# Patient Record
Sex: MALE | Race: WHITE | NOT HISPANIC OR LATINO | Employment: OTHER | URBAN - METROPOLITAN AREA
[De-identification: names, ages, dates, MRNs, and addresses within clinical notes are randomized per-mention and may not be internally consistent; named-entity substitution may affect disease eponyms.]

---

## 2019-03-10 ENCOUNTER — APPOINTMENT (EMERGENCY)
Dept: CT IMAGING | Facility: HOSPITAL | Age: 79
DRG: 176 | End: 2019-03-10
Payer: COMMERCIAL

## 2019-03-10 ENCOUNTER — APPOINTMENT (EMERGENCY)
Dept: RADIOLOGY | Facility: HOSPITAL | Age: 79
DRG: 176 | End: 2019-03-10
Payer: COMMERCIAL

## 2019-03-10 ENCOUNTER — HOSPITAL ENCOUNTER (INPATIENT)
Facility: HOSPITAL | Age: 79
LOS: 2 days | Discharge: HOME | DRG: 176 | End: 2019-03-12
Attending: EMERGENCY MEDICINE | Admitting: HOSPITALIST
Payer: COMMERCIAL

## 2019-03-10 ENCOUNTER — APPOINTMENT (INPATIENT)
Dept: ULTRASOUND IMAGING | Facility: HOSPITAL | Age: 79
DRG: 176 | End: 2019-03-10
Payer: COMMERCIAL

## 2019-03-10 DIAGNOSIS — R77.8 ELEVATED TROPONIN: ICD-10-CM

## 2019-03-10 DIAGNOSIS — I26.99 OTHER ACUTE PULMONARY EMBOLISM WITHOUT ACUTE COR PULMONALE (HCC): Primary | ICD-10-CM

## 2019-03-10 LAB
ALBUMIN SERPL BCP-MCNC: 3.3 G/DL (ref 3.5–5)
ALP SERPL-CCNC: 77 U/L (ref 46–116)
ALT SERPL W P-5'-P-CCNC: 30 U/L (ref 12–78)
ANION GAP SERPL CALCULATED.3IONS-SCNC: 8 MMOL/L (ref 4–13)
APTT PPP: 28 SECONDS (ref 26–38)
AST SERPL W P-5'-P-CCNC: 22 U/L (ref 5–45)
ATRIAL RATE: 101 BPM
BASOPHILS # BLD AUTO: 0.06 THOUSANDS/ΜL (ref 0–0.1)
BASOPHILS NFR BLD AUTO: 1 % (ref 0–1)
BILIRUB SERPL-MCNC: 0.7 MG/DL (ref 0.2–1)
BUN SERPL-MCNC: 22 MG/DL (ref 5–25)
CALCIUM SERPL-MCNC: 9.4 MG/DL (ref 8.3–10.1)
CHLORIDE SERPL-SCNC: 102 MMOL/L (ref 100–108)
CO2 SERPL-SCNC: 29 MMOL/L (ref 21–32)
CREAT SERPL-MCNC: 1.2 MG/DL (ref 0.6–1.3)
DEPRECATED D DIMER PPP: 7695 NG/ML (FEU)
EOSINOPHIL # BLD AUTO: 0.27 THOUSAND/ΜL (ref 0–0.61)
EOSINOPHIL NFR BLD AUTO: 3 % (ref 0–6)
ERYTHROCYTE [DISTWIDTH] IN BLOOD BY AUTOMATED COUNT: 13 % (ref 11.6–15.1)
ERYTHROCYTE [DISTWIDTH] IN BLOOD BY AUTOMATED COUNT: 13.3 % (ref 11.6–15.1)
GFR SERPL CREATININE-BSD FRML MDRD: 58 ML/MIN/1.73SQ M
GLUCOSE SERPL-MCNC: 142 MG/DL (ref 65–140)
HCT VFR BLD AUTO: 47.4 % (ref 36.5–49.3)
HCT VFR BLD AUTO: 48.1 % (ref 36.5–49.3)
HGB BLD-MCNC: 15.5 G/DL (ref 12–17)
HGB BLD-MCNC: 16.4 G/DL (ref 12–17)
IMM GRANULOCYTES # BLD AUTO: 0.05 THOUSAND/UL (ref 0–0.2)
IMM GRANULOCYTES NFR BLD AUTO: 1 % (ref 0–2)
INR PPP: 1.05 (ref 0.86–1.17)
LYMPHOCYTES # BLD AUTO: 2.34 THOUSANDS/ΜL (ref 0.6–4.47)
LYMPHOCYTES NFR BLD AUTO: 23 % (ref 14–44)
MCH RBC QN AUTO: 29 PG (ref 26.8–34.3)
MCH RBC QN AUTO: 29.8 PG (ref 26.8–34.3)
MCHC RBC AUTO-ENTMCNC: 32.7 G/DL (ref 31.4–37.4)
MCHC RBC AUTO-ENTMCNC: 34.1 G/DL (ref 31.4–37.4)
MCV RBC AUTO: 87 FL (ref 82–98)
MCV RBC AUTO: 89 FL (ref 82–98)
MONOCYTES # BLD AUTO: 0.65 THOUSAND/ΜL (ref 0.17–1.22)
MONOCYTES NFR BLD AUTO: 6 % (ref 4–12)
NEUTROPHILS # BLD AUTO: 6.78 THOUSANDS/ΜL (ref 1.85–7.62)
NEUTS SEG NFR BLD AUTO: 66 % (ref 43–75)
NRBC BLD AUTO-RTO: 0 /100 WBCS
NT-PROBNP SERPL-MCNC: 142 PG/ML
P AXIS: 48 DEGREES
PLATELET # BLD AUTO: 182 THOUSANDS/UL (ref 149–390)
PLATELET # BLD AUTO: 191 THOUSANDS/UL (ref 149–390)
PMV BLD AUTO: 9.4 FL (ref 8.9–12.7)
PMV BLD AUTO: 9.7 FL (ref 8.9–12.7)
POTASSIUM SERPL-SCNC: 3.4 MMOL/L (ref 3.5–5.3)
PR INTERVAL: 128 MS
PROT SERPL-MCNC: 7.4 G/DL (ref 6.4–8.2)
PROTHROMBIN TIME: 13.4 SECONDS (ref 11.8–14.2)
QRS AXIS: -12 DEGREES
QRSD INTERVAL: 100 MS
QT INTERVAL: 352 MS
QTC INTERVAL: 451 MS
RBC # BLD AUTO: 5.35 MILLION/UL (ref 3.88–5.62)
RBC # BLD AUTO: 5.51 MILLION/UL (ref 3.88–5.62)
SODIUM SERPL-SCNC: 139 MMOL/L (ref 136–145)
T WAVE AXIS: 73 DEGREES
TROPONIN I SERPL-MCNC: 0.48 NG/ML
TROPONIN I SERPL-MCNC: 1.85 NG/ML
TROPONIN I SERPL-MCNC: 2.37 NG/ML
VENTRICULAR RATE: 99 BPM
WBC # BLD AUTO: 10.15 THOUSAND/UL (ref 4.31–10.16)
WBC # BLD AUTO: 11.11 THOUSAND/UL (ref 4.31–10.16)

## 2019-03-10 PROCEDURE — 71275 CT ANGIOGRAPHY CHEST: CPT

## 2019-03-10 PROCEDURE — 85027 COMPLETE CBC AUTOMATED: CPT | Performed by: HOSPITALIST

## 2019-03-10 PROCEDURE — 83880 ASSAY OF NATRIURETIC PEPTIDE: CPT | Performed by: EMERGENCY MEDICINE

## 2019-03-10 PROCEDURE — 36415 COLL VENOUS BLD VENIPUNCTURE: CPT | Performed by: EMERGENCY MEDICINE

## 2019-03-10 PROCEDURE — 93970 EXTREMITY STUDY: CPT

## 2019-03-10 PROCEDURE — 85730 THROMBOPLASTIN TIME PARTIAL: CPT | Performed by: EMERGENCY MEDICINE

## 2019-03-10 PROCEDURE — 85379 FIBRIN DEGRADATION QUANT: CPT | Performed by: EMERGENCY MEDICINE

## 2019-03-10 PROCEDURE — 85610 PROTHROMBIN TIME: CPT | Performed by: EMERGENCY MEDICINE

## 2019-03-10 PROCEDURE — 99291 CRITICAL CARE FIRST HOUR: CPT

## 2019-03-10 PROCEDURE — 99223 1ST HOSP IP/OBS HIGH 75: CPT | Performed by: HOSPITALIST

## 2019-03-10 PROCEDURE — 85730 THROMBOPLASTIN TIME PARTIAL: CPT | Performed by: HOSPITALIST

## 2019-03-10 PROCEDURE — 93010 ELECTROCARDIOGRAM REPORT: CPT | Performed by: INTERNAL MEDICINE

## 2019-03-10 PROCEDURE — 80053 COMPREHEN METABOLIC PANEL: CPT | Performed by: EMERGENCY MEDICINE

## 2019-03-10 PROCEDURE — 85610 PROTHROMBIN TIME: CPT | Performed by: HOSPITALIST

## 2019-03-10 PROCEDURE — 84484 ASSAY OF TROPONIN QUANT: CPT | Performed by: EMERGENCY MEDICINE

## 2019-03-10 PROCEDURE — 93005 ELECTROCARDIOGRAM TRACING: CPT

## 2019-03-10 PROCEDURE — 71046 X-RAY EXAM CHEST 2 VIEWS: CPT

## 2019-03-10 PROCEDURE — 85025 COMPLETE CBC W/AUTO DIFF WBC: CPT | Performed by: EMERGENCY MEDICINE

## 2019-03-10 PROCEDURE — 84484 ASSAY OF TROPONIN QUANT: CPT | Performed by: HOSPITALIST

## 2019-03-10 PROCEDURE — 96360 HYDRATION IV INFUSION INIT: CPT

## 2019-03-10 RX ORDER — ALPRAZOLAM 0.5 MG/1
0.5 TABLET ORAL
Status: DISCONTINUED | OUTPATIENT
Start: 2019-03-10 | End: 2019-03-12 | Stop reason: HOSPADM

## 2019-03-10 RX ORDER — HEPARIN SODIUM 1000 [USP'U]/ML
4200 INJECTION, SOLUTION INTRAVENOUS; SUBCUTANEOUS AS NEEDED
Status: DISCONTINUED | OUTPATIENT
Start: 2019-03-10 | End: 2019-03-10 | Stop reason: SDUPTHER

## 2019-03-10 RX ORDER — HEPARIN SODIUM 1000 [USP'U]/ML
8400 INJECTION, SOLUTION INTRAVENOUS; SUBCUTANEOUS ONCE
Status: COMPLETED | OUTPATIENT
Start: 2019-03-10 | End: 2019-03-10

## 2019-03-10 RX ORDER — HEPARIN SODIUM 1000 [USP'U]/ML
4000 INJECTION, SOLUTION INTRAVENOUS; SUBCUTANEOUS AS NEEDED
Status: DISPENSED | OUTPATIENT
Start: 2019-03-10 | End: 2019-03-12

## 2019-03-10 RX ORDER — HEPARIN SODIUM 10000 [USP'U]/100ML
3-30 INJECTION, SOLUTION INTRAVENOUS
Status: DISCONTINUED | OUTPATIENT
Start: 2019-03-10 | End: 2019-03-10 | Stop reason: SDUPTHER

## 2019-03-10 RX ORDER — ASPIRIN 325 MG
325 TABLET ORAL ONCE
Status: COMPLETED | OUTPATIENT
Start: 2019-03-10 | End: 2019-03-10

## 2019-03-10 RX ORDER — HEPARIN SODIUM 1000 [USP'U]/ML
8400 INJECTION, SOLUTION INTRAVENOUS; SUBCUTANEOUS AS NEEDED
Status: DISCONTINUED | OUTPATIENT
Start: 2019-03-10 | End: 2019-03-10 | Stop reason: SDUPTHER

## 2019-03-10 RX ORDER — HEPARIN SODIUM 10000 [USP'U]/100ML
3-30 INJECTION, SOLUTION INTRAVENOUS
Status: DISPENSED | OUTPATIENT
Start: 2019-03-10 | End: 2019-03-12

## 2019-03-10 RX ORDER — HEPARIN SODIUM 1000 [USP'U]/ML
8000 INJECTION, SOLUTION INTRAVENOUS; SUBCUTANEOUS AS NEEDED
Status: ACTIVE | OUTPATIENT
Start: 2019-03-10 | End: 2019-03-12

## 2019-03-10 RX ADMIN — IOHEXOL 85 ML: 350 INJECTION, SOLUTION INTRAVENOUS at 16:38

## 2019-03-10 RX ADMIN — HEPARIN SODIUM AND DEXTROSE 18 UNITS/KG/HR: 10000; 5 INJECTION INTRAVENOUS at 17:41

## 2019-03-10 RX ADMIN — SODIUM CHLORIDE 1000 ML: 0.9 INJECTION, SOLUTION INTRAVENOUS at 16:19

## 2019-03-10 RX ADMIN — HEPARIN SODIUM 8400 UNITS: 1000 INJECTION, SOLUTION INTRAVENOUS; SUBCUTANEOUS at 17:41

## 2019-03-10 RX ADMIN — ASPIRIN 325 MG: 325 TABLET ORAL at 15:35

## 2019-03-10 NOTE — PLAN OF CARE
Problem: Potential for Falls  Goal: Patient will remain free of falls  Description  INTERVENTIONS:  - Assess patient frequently for physical needs  -  Identify cognitive and physical deficits and behaviors that affect risk of falls    -  [place] fall precautions as indicated by assessment   - Educate patient/family on patient safety including physical limitations  - Instruct patient to call for assistance with activity based on assessment  - Modify environment to reduce risk of injury  - Consider OT/PT consult to assist with strengthening/mobility  Outcome: Progressing     Problem: PAIN - ADULT  Goal: Verbalizes/displays adequate comfort level or baseline comfort level  Description  Interventions:  - Encourage patient to monitor pain and request assistance  - Assess pain using appropriate pain scale  - Administer analgesics based on type and severity of pain and evaluate response  - Implement non-pharmacological measures as appropriate and evaluate response  - Consider cultural and social influences on pain and pain management  - Notify physician/advanced practitioner if interventions unsuccessful or patient reports new pain  Outcome: Progressing     Problem: INFECTION - ADULT  Goal: Absence or prevention of progression during hospitalization  Description  INTERVENTIONS:  - Assess and monitor for signs and symptoms of infection  - Monitor lab/diagnostic results  - Monitor all insertion sites, i e  indwelling lines, tubes, and drains  - Monitor endotracheal (as able) and nasal secretions for changes in amount and color  - [place] appropriate cooling/warming therapies per order  - Administer medications as ordered  - Instruct and encourage patient and family to use good hand hygiene technique  - Identify and instruct in appropriate isolation precautions for identified infection/condition  Outcome: Progressing  Goal: Absence of fever/infection during neutropenic period  Description  INTERVENTIONS:  - Monitor WBC  - Implement neutropenic guidelines  Outcome: Progressing     Problem: SAFETY ADULT  Goal: Maintain or return to baseline ADL function  Description  INTERVENTIONS:  -  Assess patient's ability to carry out ADLs; assess patient's baseline for ADL function and identify physical deficits which impact ability to perform ADLs (bathing, care of mouth/teeth, toileting, grooming, dressing, etc )  - Assess/evaluate cause of self-care deficits   - Assess range of motion  - Assess patient's mobility; develop plan if impaired  - Assess patient's need for assistive devices and provide as appropriate  - Encourage maximum independence but intervene and supervise when necessary  ¯ Involve family in performance of ADLs  ¯ Assess for home care needs following discharge   ¯ Request OT consult to assist with ADL evaluation and planning for discharge  ¯ Provide patient education as appropriate  Outcome: Progressing  Goal: Maintain or return mobility status to optimal level  Description  INTERVENTIONS:  - Assess patient's baseline mobility status (ambulation, transfers, stairs, etc )    - Identify cognitive and physical deficits and behaviors that affect mobility  - Identify mobility aids required to assist with transfers and/or ambulation (gait belt, sit-to-stand, lift, walker, cane, etc )  - [place] fall precautions as indicated by assessment  - Record patient progress and toleration of activity level on Mobility SBAR; progress patient to next Phase/Stage  - Instruct patient to call for assistance with activity based on assessment  - Request Rehabilitation consult to assist with strengthening/weightbearing, etc   Outcome: Progressing     Problem: DISCHARGE PLANNING  Goal: Discharge to home or other facility with appropriate resources  Description  INTERVENTIONS:  - Identify barriers to discharge w/patient and caregiver  - Arrange for needed discharge resources and transportation as appropriate  - Identify discharge learning needs (meds, wound care, etc )  - Arrange for interpretive services to assist at discharge as needed  - Refer to Case Management Department for coordinating discharge planning if the patient needs post-hospital services based on physician/advanced practitioner order or complex needs related to functional status, cognitive ability, or social support system  Outcome: Progressing     Problem: Knowledge Deficit  Goal: Patient/family/caregiver demonstrates understanding of disease process, treatment plan, medications, and discharge instructions  Description  Complete learning assessment and assess knowledge base    Interventions:  - Provide teaching at level of understanding  - Provide teaching via preferred learning methods  Outcome: Progressing

## 2019-03-10 NOTE — ED PROVIDER NOTES
History  Chief Complaint   Patient presents with    Shortness of Breath     Pt reports having BM and afterwards while trying to get up he had SOB  Pt reports breathing is back to normal  Pt denies CP but reports having taken 1 nitro which reports may be   Patient is a 14-year-old male with history of hypertension and hyperlipidemia who presents with shortness of breath  Patient states he was sitting on the toilet and developed acute onset shortness of breath when he stood up  States it did not resolve after several minutes and decided to take one sublingual nitroglycerin  He called his wife and she called EMS  He states that his breathing has improved but he still feels somewhat winded  Denies any associated chest pain, nausea, vomiting, diaphoresis  He does admit to mild lightheadedness which has resolved  He denies any similar symptoms in the past   He remarks that he has a history dysrhythmia and was told that it was benign but that he could take a beta-blocker  He opted not to take a beta-blocker but states his normal heart rate is in the 60s to 70s  History provided by:  Patient  Shortness of Breath   Severity:  Moderate  Onset quality:  Sudden  Duration:  2 hours  Timing:  Constant  Progression:  Partially resolved  Chronicity:  New  Ineffective treatments:  None tried  Associated symptoms: no abdominal pain, no chest pain, no cough, no diaphoresis, no fever, no headaches, no neck pain, no rash, no sore throat, no sputum production and no vomiting        Prior to Admission Medications   Prescriptions Last Dose Informant Patient Reported? Taking?    ALPRAZOLAM PO   Yes Yes   Sig: Take 0 5 mg by mouth    LISINOPRIL PO   Yes Yes   Sig: Take by mouth   METOPROLOL SUCCINATE PO   Yes Yes   Sig: Take by mouth      Facility-Administered Medications: None       Past Medical History:   Diagnosis Date    Hyperlipidemia     Hypertension        Past Surgical History:   Procedure Laterality Date  CARDIAC SURGERY      2 stents placed       History reviewed  No pertinent family history  I have reviewed and agree with the history as documented  Social History     Tobacco Use    Smoking status: Never Smoker    Smokeless tobacco: Never Used   Substance Use Topics    Alcohol use: Never     Frequency: Never    Drug use: Never        Review of Systems   Constitutional: Negative for chills, diaphoresis and fever  HENT: Negative for nosebleeds, sore throat and trouble swallowing  Eyes: Negative for photophobia, pain and visual disturbance  Respiratory: Positive for shortness of breath  Negative for cough, sputum production and chest tightness  Cardiovascular: Negative for chest pain, palpitations and leg swelling  Gastrointestinal: Negative for abdominal pain, constipation, diarrhea, nausea and vomiting  Endocrine: Negative for polydipsia and polyuria  Genitourinary: Negative for difficulty urinating, dysuria and hematuria  Musculoskeletal: Negative for back pain, neck pain and neck stiffness  Skin: Negative for pallor and rash  Neurological: Positive for light-headedness  Negative for dizziness, syncope and headaches  All other systems reviewed and are negative  Physical Exam  Physical Exam   Constitutional: He is oriented to person, place, and time  He appears well-developed and well-nourished  No distress  HENT:   Head: Normocephalic and atraumatic  Mouth/Throat: Oropharynx is clear and moist and mucous membranes are normal    Eyes: Pupils are equal, round, and reactive to light  EOM are normal    Neck: Normal range of motion  Neck supple  Cardiovascular: Normal rate, regular rhythm, normal heart sounds, intact distal pulses and normal pulses  Pulmonary/Chest: Effort normal and breath sounds normal  No respiratory distress  Abdominal: Soft  He exhibits no distension  There is no tenderness  There is no rigidity, no rebound and no guarding     Musculoskeletal: Normal range of motion  He exhibits no tenderness  Right lower leg: He exhibits edema (Trace pitting edema in bilateral lower extremities  )  Lymphadenopathy:     He has no cervical adenopathy  Neurological: He is alert and oriented to person, place, and time  He has normal strength  No cranial nerve deficit or sensory deficit  Skin: Skin is warm and dry  Capillary refill takes less than 2 seconds  Psychiatric: He has a normal mood and affect  Nursing note and vitals reviewed        Vital Signs  ED Triage Vitals   Temperature Pulse Respirations Blood Pressure SpO2   03/10/19 1435 03/10/19 1415 03/10/19 1415 03/10/19 1415 03/10/19 1415   98 5 °F (36 9 °C) 104 18 126/79 95 %      Temp Source Heart Rate Source Patient Position - Orthostatic VS BP Location FiO2 (%)   03/10/19 1435 03/10/19 1415 03/10/19 1415 03/10/19 1415 --   Oral Monitor Sitting Right arm       Pain Score       03/10/19 1415       No Pain           Vitals:    03/10/19 1700 03/10/19 1730 03/10/19 1800 03/10/19 1837   BP: 132/85 138/83 (!) 151/101 153/97   Pulse: 102 102 102 (!) 107   Patient Position - Orthostatic VS: Lying Lying Lying Lying       Visual Acuity      ED Medications  Medications   heparin (porcine) 25,000 units in 250 mL infusion (premix) (18 Units/kg/hr × 105 kg (Order-Specific) Intravenous New Bag 3/10/19 1741)   heparin (porcine) injection 8,400 Units (has no administration in time range)   heparin (porcine) injection 4,200 Units (has no administration in time range)   heparin (VTE/PE) high (has no administration in time range)   aspirin tablet 325 mg (325 mg Oral Given 3/10/19 1535)   sodium chloride 0 9 % bolus 1,000 mL (1,000 mL Intravenous New Bag 3/10/19 1619)   iohexol (OMNIPAQUE) 350 MG/ML injection (SINGLE-DOSE) 85 mL (85 mL Intravenous Given 3/10/19 1638)   heparin (porcine) injection 8,400 Units (8,400 Units Intravenous Given 3/10/19 1741)       Diagnostic Studies  Results Reviewed     Procedure Component Value Units Date/Time    CBC [[id number]] Collected:  03/10/19 1849    Lab Status:  No result Specimen:  Blood from Arm, Left     APTT [[id number]]     Lab Status:  No result Specimen:  Blood     Protime-INR [[id number]]     Lab Status:  No result Specimen:  Blood     APTT [[id number]]  (Normal) Collected:  03/10/19 1502    Lab Status:  Final result Specimen:  Blood from Arm, Right Updated:  03/10/19 1734     PTT 28 seconds     Protime-INR [[id number]]  (Normal) Collected:  03/10/19 1502    Lab Status:  Final result Specimen:  Blood from Arm, Right Updated:  03/10/19 1734     Protime 13 4 seconds      INR 1 05    D-Dimer [[id number]]  (Abnormal) Collected:  03/10/19 1502    Lab Status:  Final result Specimen:  Blood from Arm, Right Updated:  03/10/19 1549     D-Dimer, Quant 7,695 ng/ml (FEU)     B-type natriuretic peptide [[id number]]  (Normal) Collected:  03/10/19 1502    Lab Status:  Final result Specimen:  Blood from Arm, Right Updated:  03/10/19 1536     NT-proBNP 142 pg/mL     Troponin I [[id number]]  (Abnormal) Collected:  03/10/19 1502    Lab Status:  Final result Specimen:  Blood from Arm, Right Updated:  03/10/19 1531     Troponin I 0 48 ng/mL     Comprehensive metabolic panel [[id number]]  (Abnormal) Collected:  03/10/19 1502    Lab Status:  Final result Specimen:  Blood from Arm, Right Updated:  03/10/19 1531     Sodium 139 mmol/L      Potassium 3 4 mmol/L      Chloride 102 mmol/L      CO2 29 mmol/L      ANION GAP 8 mmol/L      BUN 22 mg/dL      Creatinine 1 20 mg/dL      Glucose 142 mg/dL      Calcium 9 4 mg/dL      AST 22 U/L      ALT 30 U/L      Alkaline Phosphatase 77 U/L      Total Protein 7 4 g/dL      Albumin 3 3 g/dL      Total Bilirubin 0 70 mg/dL      eGFR 58 ml/min/1 73sq m     Narrative:       National Kidney Disease Education Program recommendations are as follows:  GFR calculation is accurate only with a steady state creatinine  Chronic Kidney disease less than 60 ml/min/1 73 sq  meters  Kidney failure less than 15 ml/min/1 73 sq  meters  CBC and differential [[id number]] Collected:  03/10/19 1502    Lab Status:  Final result Specimen:  Blood from Arm, Right Updated:  03/10/19 1508     WBC 10 15 Thousand/uL      RBC 5 51 Million/uL      Hemoglobin 16 4 g/dL      Hematocrit 48 1 %      MCV 87 fL      MCH 29 8 pg      MCHC 34 1 g/dL      RDW 13 0 %      MPV 9 4 fL      Platelets 428 Thousands/uL      nRBC 0 /100 WBCs      Neutrophils Relative 66 %      Immat GRANS % 1 %      Lymphocytes Relative 23 %      Monocytes Relative 6 %      Eosinophils Relative 3 %      Basophils Relative 1 %      Neutrophils Absolute 6 78 Thousands/µL      Immature Grans Absolute 0 05 Thousand/uL      Lymphocytes Absolute 2 34 Thousands/µL      Monocytes Absolute 0 65 Thousand/µL      Eosinophils Absolute 0 27 Thousand/µL      Basophils Absolute 0 06 Thousands/µL                  CTA ED chest PE study   Final Result by Epifanio Martinez MD (03/10 1706)         1  Extensive bilateral pulmonary emboli as described above including pulmonary emboli within right and left pulmonary arteries  No main pulmonary artery or saddle pulmonary embolus identified  2   Accurate calculation of the ratio of right ventricular to left ventricular diameter (RV/LV ratio) is difficult due to left wall hypertrophy, however it does visually appear to be greater than 0 9 which is an indicator of right heart strain  Additionally, there is reflux of contrast into the intrahepatic IVC and hepatic veins, which can be seen with right heart strain  An abnormal RV/LV ratio has been shown to be associated with an increased risk of 30 day mortality in the setting of acute pulmonary embolism  I personally discussed this study with [address] [place] [address] on 3/10/2019 at 5:06 PM                    Workstation performed: [id number]         XR chest 2 views   ED Interpretation by Hardy Quintanilla DO (03/10 1510)   Gastric air-fluid level    Minimal left basilar atelectasis  Final Result by Emily Ames MD (03/10 1647)      No acute cardiopulmonary disease  Elevation of left hemidiaphragm  Workstation performed: [id number]         VAS lower limb venous duplex study, complete bilateral    (Results Pending)              Procedures  ECG 12 Lead Documentation  Date/Time: 3/10/2019 2:34 PM  Performed by: Macario Galindo DO  Authorized by: Macario Galindo DO     ECG reviewed by me, the ED Provider: yes    Patient location:  ED  Previous ECG:     Previous ECG:  Unavailable    Comparison to cardiac monitor: Yes    Comments:      Sinus rhythm at a rate of 99 beats per minute  Normal intervals  Normal axis  Normal QRS  Nonspecific ST T wave abnormalities  No old for comparison  CriticalCare Time  Performed by: Macario Galindo DO  Authorized by: Macario Galindo DO     Critical care provider statement:     Critical care time (minutes):  30    Critical care start time:  3/10/2019 5:10 PM    Critical care end time:  3/10/2019 5:40 PM    Critical care time was exclusive of:  Separately billable procedures and treating other patients    Critical care was necessary to treat or prevent imminent or life-threatening deterioration of the following conditions:  Circulatory failure (Pulmonary Embolism)    Critical care was time spent personally by me on the following activities:  Discussions with primary provider, examination of patient, development of treatment plan with patient or surrogate, re-evaluation of patient's condition and ordering and performing treatments and interventions           Phone Contacts  ED Phone Contact    ED Course  ED Course as of Mar 10 1856   Sun Mar 10, 2019   1706 Received a call from Radiology  Patient has bilateral PEs  No saddle or main pulmonary artery emboli  There does appear to be a small degree of right heart strain  However patient is normotensive        1813 DVT study positive for acute nonocclusive thrombus in femoral vein  Patient is already being anticoagulated  MDM  Number of Diagnoses or Management Options  Elevated troponin: new and requires workup  Other acute pulmonary embolism without acute cor pulmonale [facility]): new and requires workup  Diagnosis management comments: Patient presents with shortness of breath  He appears mildly tachypneic with conversation  He is tachycardic with a oxygen saturation in the mid 90s  D-dimer significantly elevated  CTA confirms presence of bilateral PE  No thrombus in main pulmonary branches  CT shows possible right heart strain  However patient has not had any episodes of hypotension and is nontoxic appearing  Heparin drip ordered  Ultrasound confirms thrombus in femoral vein which is likely source of PE  Will continue anticoagulation and hospitalize for further treatment  There is no indication for thrombolysis         Amount and/or Complexity of Data Reviewed  Clinical lab tests: ordered and reviewed  Tests in the radiology section of CPT®: ordered and reviewed  Tests in the medicine section of CPT®: ordered and reviewed  Review and summarize past medical records: yes  Discuss the patient with other providers: yes  Independent visualization of images, tracings, or specimens: yes    Risk of Complications, Morbidity, and/or Mortality  Presenting problems: high  Diagnostic procedures: high  Management options: high    Patient Progress  Patient progress: stable      Disposition  Final diagnoses:   Other acute pulmonary embolism without acute cor pulmonale ([facility] [address] )   Elevated troponin     Time reflects when diagnosis was documented in both MDM as applicable and the Disposition within this note     Time User Action Codes Description Comment    3/10/2019  5:24 PM Corona Green Add [I26 [id number]] Other acute pulmonary embolism without acute cor pulmonale ([facility] [address] )     3/10/2019  5:26 PM Lisa KELLOGG Add [R74 8] Elevated troponin       ED Disposition     ED Disposition Condition Date/Time Comment    Admit Stable Sun Mar 10, 2019  5:24 PM Case was discussed with Dr Ruby Kilgore and the patient's admission status was agreed to be Admission Status: inpatient status to the service of Dr Ruby Kilgore   Follow-up Information    None         Current Discharge Medication List      CONTINUE these medications which have NOT CHANGED    Details   ALPRAZOLAM PO Take 0 5 mg by mouth       LISINOPRIL PO Take by mouth      METOPROLOL SUCCINATE PO Take by mouth           No discharge procedures on file      ED Provider  Electronically Signed by           Veena Burger DO  03/10/19 6266

## 2019-03-10 NOTE — ASSESSMENT & PLAN NOTE
It looks like he has a DVT of the leg as well  He also some evidence on CT chest of right heart strain  Will start him on IV heparin  He would prefer to use one of the newer anticoagulants because his mother was on Coumadin and he did not like all the blood testing    So when we are ready to transition him to oral medications, we could use Xarelto or Eliquis  Check Echocardiogram to look for right heart strain  He is currently getting LE u/s in the ER

## 2019-03-10 NOTE — H&P
H&P- Belen Schwab 1940, 70 y o  male MRN: [id number]    Unit/Bed#: ED 11 Encounter: [id number]    Primary Care Provider: Burgess Polo MD   Date and time admitted to hospital: 3/10/2019  2:09 PM        * Acute pulmonary embolism [facility])  Assessment & Plan  It looks like he has a DVT of the leg as well  He also some evidence on CT chest of right heart strain  Will start him on IV heparin  He would prefer to use one of the newer anticoagulants because his mother was on Coumadin and he did not like all the blood testing  So when we are ready to transition him to oral medications, we could use Xarelto or Eliquis  Check Echocardiogram to look for right heart strain  He is currently getting LE u/s in the ER          Chief Complaint:   Shortness of breath      History of Present Illness:    Belen Schwab is a 70 y o  male who presents with shortness of breath  He states this started this morning  It was at rest   He just could not take a deep breath and was having do shallow respirations very frequently  No chest pain  No productive cough  No fever or chills  He states he has bilateral hip arthritis and is very sedentary due to the hip pain  He has been hesitant to get a hip replacement  So most of the day he just sits in a chair  No trauma to his legs  No trauma to his chest   He has no history of blood clots  He is getting a ultrasound of his lower extremities during my evaluation is complaining of some pain in the inner thighs with the ultrasound  And there is an apparent DVT on the right  His mother was on Coumadin but he is not sure why  He does not know any other blood clots in the family  No long distance travel  No no malignancies  Does not smoke cigarettes         Review of Systems:    Review of Systems   Constitutional: Positive for activity change  HENT: Negative  Eyes: Negative  Respiratory: Positive for shortness of breath  Negative for chest tightness      Cardiovascular: Negative  Negative for chest pain  Gastrointestinal: Negative  Endocrine: Negative  Genitourinary: Negative  Musculoskeletal: Negative  All other systems reviewed and are negative  Past Medical and Surgical History:     Past Medical History:   Diagnosis Date    Hyperlipidemia     Hypertension        History reviewed  No pertinent surgical history  Home Medications:    Prior to Admission medications    Medication Sig Start Date End Date Taking? Authorizing Provider   ALPRAZOLAM PO Take by mouth   Yes Historical Provider, MD   LISINOPRIL PO Take by mouth   Yes Historical Provider, MD   METOPROLOL SUCCINATE PO Take by mouth   Yes Historical Provider, MD     I have reviewed home medications with patient personally  Allergies: Allergies   Allergen Reactions    Codeine          Social History:    Substance Use History:   Social History     Substance and Sexual Activity   Alcohol Use Never    Frequency: Never     Social History     Tobacco Use   Smoking Status Never Smoker   Smokeless Tobacco Never Used     Social History     Substance and Sexual Activity   Drug Use Never         Family History:    non-contributory      Physical Exam:     Vitals:   Blood Pressure: 138/83 (03/10/19 1730)  Pulse: 102 (03/10/19 1730)  Temperature: 98 5 °F (36 9 °C) (03/10/19 1435)  Temp Source: Oral (03/10/19 1435)  Respirations: 20 (03/10/19 1730)  Height: 5' 8" (172 7 cm) (03/10/19 1435)  Weight - Scale: 107 kg (235 lb 14 3 oz) (03/10/19 1435)  SpO2: 94 % (03/10/19 1730)    Physical Exam   HENT:   Head: Normocephalic and atraumatic  Eyes: Pupils are equal, round, and reactive to light  EOM are normal    Cardiovascular: Normal rate and regular rhythm  Exam reveals no gallop and no friction rub  No murmur heard  Pulmonary/Chest: Effort normal and breath sounds normal  He has no wheezes  He has no rales  Abdominal: Soft  Bowel sounds are normal  There is no tenderness     Musculoskeletal: He exhibits no edema  Nursing note and vitals reviewed  Additional Data:     Lab Results: I have personally reviewed pertinent reports  Results from last 7 days   Lab Units 03/10/19  1502   WBC Thousand/uL 10 15   HEMOGLOBIN g/dL 16 4   HEMATOCRIT % 48 1   PLATELETS Thousands/uL 182   NEUTROS PCT % 66   LYMPHS PCT % 23   MONOS PCT % 6   EOS PCT % 3     Results from last 7 days   Lab Units 03/10/19  1502   POTASSIUM mmol/L 3 4*   CHLORIDE mmol/L 102   CO2 mmol/L 29   BUN mg/dL 22   CREATININE mg/dL 1 20   CALCIUM mg/dL 9 4   ALK PHOS U/L 77   ALT U/L 30   AST U/L 22     Results from last 7 days   Lab Units 03/10/19  1502   INR  1 05                 Imaging: I have personally reviewed pertinent reports  CTA ED chest PE study   Final Result by Lj Singh MD (03/10 6464)         1  Extensive bilateral pulmonary emboli as described above including pulmonary emboli within right and left pulmonary arteries  No main pulmonary artery or saddle pulmonary embolus identified  2   Accurate calculation of the ratio of right ventricular to left ventricular diameter (RV/LV ratio) is difficult due to left wall hypertrophy, however it does visually appear to be greater than 0 9 which is an indicator of right heart strain  Additionally, there is reflux of contrast into the intrahepatic IVC and hepatic veins, which can be seen with right heart strain  An abnormal RV/LV ratio has been shown to be associated with an increased risk of 30 day mortality in the setting of acute pulmonary embolism  I personally discussed this study with [address] [place] [address] on 3/10/2019 at 5:06 PM                    Workstation performed: [id number]         XR chest 2 views   ED Interpretation by Terri Leong DO (03/10 7482)   Gastric air-fluid level  Minimal left basilar atelectasis  Final Result by Lj Singh MD (03/10 0693)      No acute cardiopulmonary disease        Elevation of left hemidiaphragm  Workstation performed: [id number]         VAS lower limb venous duplex study, complete bilateral    (Results Pending)             VTE Prophylaxis: Heparin Drip        Anticipated Length of Stay:  Patient will be admitted on an Inpatient basis with an anticipated length of stay of  greater than 2 midnights  Justification for Hospital Stay:  Patient will be on heparin drip  Needs an echocardiogram to look for right heart strain  He will need to be transitioned to oral medications  His length of stay will be greater than 2 midnights      Total Time for Visit, including Counseling / Coordination of Care: 45 minutes  Greater than 50% of this total time spent on direct patient counseling and coordination of care  ** Please Note: This note has been constructed using a voice recognition system   **

## 2019-03-11 LAB
APTT PPP: 121 SECONDS (ref 26–38)
APTT PPP: 46 SECONDS (ref 26–38)
APTT PPP: 63 SECONDS (ref 26–38)
APTT PPP: >210 SECONDS (ref 26–38)
DEPRECATED AT III PPP: 76 % OF NORMAL (ref 92–136)
ERYTHROCYTE [DISTWIDTH] IN BLOOD BY AUTOMATED COUNT: 13.2 % (ref 11.6–15.1)
HCT VFR BLD AUTO: 44.7 % (ref 36.5–49.3)
HGB BLD-MCNC: 14.6 G/DL (ref 12–17)
INR PPP: 1.21 (ref 0.86–1.17)
MCH RBC QN AUTO: 29 PG (ref 26.8–34.3)
MCHC RBC AUTO-ENTMCNC: 32.7 G/DL (ref 31.4–37.4)
MCV RBC AUTO: 89 FL (ref 82–98)
PLATELET # BLD AUTO: 174 THOUSANDS/UL (ref 149–390)
PMV BLD AUTO: 9.6 FL (ref 8.9–12.7)
PROT C AG ACT/NOR PPP IA: 96 % OF NORMAL (ref 60–150)
PROTHROMBIN TIME: 15 SECONDS (ref 11.8–14.2)
RBC # BLD AUTO: 5.03 MILLION/UL (ref 3.88–5.62)
TROPONIN I SERPL-MCNC: 0.96 NG/ML
TROPONIN I SERPL-MCNC: 1.35 NG/ML
TROPONIN I SERPL-MCNC: 1.38 NG/ML
TROPONIN I SERPL-MCNC: 2.51 NG/ML
WBC # BLD AUTO: 10.28 THOUSAND/UL (ref 4.31–10.16)

## 2019-03-11 PROCEDURE — 85306 CLOT INHIBIT PROT S FREE: CPT | Performed by: HOSPITALIST

## 2019-03-11 PROCEDURE — 84484 ASSAY OF TROPONIN QUANT: CPT | Performed by: HOSPITALIST

## 2019-03-11 PROCEDURE — 85305 CLOT INHIBIT PROT S TOTAL: CPT | Performed by: HOSPITALIST

## 2019-03-11 PROCEDURE — 94761 N-INVAS EAR/PLS OXIMETRY MLT: CPT

## 2019-03-11 PROCEDURE — 86147 CARDIOLIPIN ANTIBODY EA IG: CPT | Performed by: HOSPITALIST

## 2019-03-11 PROCEDURE — 85730 THROMBOPLASTIN TIME PARTIAL: CPT | Performed by: HOSPITALIST

## 2019-03-11 PROCEDURE — 99232 SBSQ HOSP IP/OBS MODERATE 35: CPT | Performed by: HOSPITALIST

## 2019-03-11 PROCEDURE — 86146 BETA-2 GLYCOPROTEIN ANTIBODY: CPT | Performed by: HOSPITALIST

## 2019-03-11 PROCEDURE — 81241 F5 GENE: CPT | Performed by: HOSPITALIST

## 2019-03-11 PROCEDURE — 85732 THROMBOPLASTIN TIME PARTIAL: CPT | Performed by: HOSPITALIST

## 2019-03-11 PROCEDURE — 85613 RUSSELL VIPER VENOM DILUTED: CPT | Performed by: HOSPITALIST

## 2019-03-11 PROCEDURE — 85705 THROMBOPLASTIN INHIBITION: CPT | Performed by: HOSPITALIST

## 2019-03-11 PROCEDURE — 85670 THROMBIN TIME PLASMA: CPT | Performed by: HOSPITALIST

## 2019-03-11 PROCEDURE — 85303 CLOT INHIBIT PROT C ACTIVITY: CPT | Performed by: HOSPITALIST

## 2019-03-11 PROCEDURE — 85027 COMPLETE CBC AUTOMATED: CPT | Performed by: HOSPITALIST

## 2019-03-11 PROCEDURE — 93970 EXTREMITY STUDY: CPT | Performed by: SURGERY

## 2019-03-11 PROCEDURE — 85300 ANTITHROMBIN III ACTIVITY: CPT | Performed by: HOSPITALIST

## 2019-03-11 PROCEDURE — 81240 F2 GENE: CPT | Performed by: HOSPITALIST

## 2019-03-11 RX ORDER — LISINOPRIL 2.5 MG/1
2.5 TABLET ORAL DAILY
Status: DISCONTINUED | OUTPATIENT
Start: 2019-03-11 | End: 2019-03-12 | Stop reason: HOSPADM

## 2019-03-11 RX ORDER — METOPROLOL SUCCINATE 50 MG/1
50 TABLET, EXTENDED RELEASE ORAL DAILY
Status: DISCONTINUED | OUTPATIENT
Start: 2019-03-11 | End: 2019-03-12 | Stop reason: HOSPADM

## 2019-03-11 RX ADMIN — METOPROLOL SUCCINATE 50 MG: 50 TABLET, EXTENDED RELEASE ORAL at 21:30

## 2019-03-11 RX ADMIN — ALPRAZOLAM 0.5 MG: 0.5 TABLET ORAL at 21:31

## 2019-03-11 RX ADMIN — ALPRAZOLAM 0.5 MG: 0.5 TABLET ORAL at 00:14

## 2019-03-11 RX ADMIN — LISINOPRIL 2.5 MG: 2.5 TABLET ORAL at 21:30

## 2019-03-11 RX ADMIN — METOPROLOL SUCCINATE 50 MG: 50 TABLET, EXTENDED RELEASE ORAL at 01:03

## 2019-03-11 RX ADMIN — LISINOPRIL 2.5 MG: 2.5 TABLET ORAL at 01:03

## 2019-03-11 RX ADMIN — HEPARIN SODIUM AND DEXTROSE 15 UNITS/KG/HR: 10000; 5 INJECTION INTRAVENOUS at 05:14

## 2019-03-11 RX ADMIN — HEPARIN SODIUM 4000 UNITS: 1000 INJECTION, SOLUTION INTRAVENOUS; SUBCUTANEOUS at 23:37

## 2019-03-11 NOTE — PROGRESS NOTES
Patient's vitals are stable  Patient is normal sinus on the monitor  Shows no signs of distress, no complaints at this time  Will continue to monitor

## 2019-03-11 NOTE — PROGRESS NOTES
Progress Note - Alba Persaud 1940, 70 y o  male MRN: [id number]    Unit/Bed#: -[place] Encounter: [id number]    Primary Care Provider: Dary Mcnamara MD   Date and time admitted to hospital: 3/10/2019  2:09 PM        * Acute pulmonary embolism [facility])  Assessment & Plan  It looks like he has a DVT of the leg as well  He also some evidence on CT chest of right heart strain  Will start him on IV heparin  He would prefer to use one of the newer anticoagulants because his mother was on Coumadin and he did not like all the blood testing  So when we are ready to transition him to oral medications, we could use Xarelto or Eliquis  Check Echocardiogram to look for right heart strain  He is currently getting LE u/s in the ER      3/11:  acute non occlusive thrombus in the R proximal femoral vein  No evidence of superficial thrombophlebitis noted  Pending echo to evaluate RV   Continue with Heparin gtt  Send for hypercoagulable workup   Will speak with SW and CM regarding NOAC as patient interested in newer medications as opposed to Coumadin  PT/OT  Ambulatory pulse ox      VTE Pharmacologic Prophylaxis:   Pharmacologic: Heparin Drip  Education and Discussions with Family / Patient: Patient made aware of plan of care    Time Spent for Care: 30 minutes  More than 50% of total time spent on counseling and coordination of care as described above      Current Length of Stay: 1 day(s)    Current Patient Status: Inpatient   Certification Statement: The patient will continue to require additional inpatient hospital stay due to heparin gtt - transion to NOAC    Discharge Plan: Home  Code Status: Level 1 - Full Code      Subjective:   Patient seen and examined; no acute resp distress  DVT and PE on heparin gtt  Maintaining good O2 saturation on room air    Objective:     Vitals:   Temp (24hrs), Av 6 °F (37 °C), Min:98 5 °F (36 9 °C), Max:98 8 °F (37 1 °C)    Temp:  [98 5 °F (36 9 °C)-98 8 °F (37 1 °C)] 98 5 °F (36 9 °C)  HR:  [] 83  Resp:  [18-20] 18  BP: (126-154)/() 142/86  SpO2:  [93 %-95 %] 93 %  Body mass index is 30 22 kg/m²  Input and Output Summary (last 24 hours): Intake/Output Summary (Last 24 hours) at 3/11/2019 0936  Last data filed at 3/11/2019 0517  Gross per 24 hour   Intake    Output 400 ml   Net -400 ml       Physical Exam:     Physical Exam  Gen -Patient comfortable   Neck- Supple  No thyromegaly or lymphadenopathy  Lungs- BLAE, no wheezing or rhochi  No resp distress  No accessory muscle use  Heart S1-S2, regular rate and rhythm, no murmurs  Abdomen-soft nontender, no organomegaly  Bowel sounds present  Extremities-no cyanosis, clubbing or edema  Distal Pulses intact in lower ext  Skin- no rash  Neuro-nonfocal       Additional Data:     Labs:    Results from last 7 days   Lab Units 03/11/19  0235  03/10/19  1502   WBC Thousand/uL 10 28*   < > 10 15   HEMOGLOBIN g/dL 14 6   < > 16 4   HEMATOCRIT % 44 7   < > 48 1   PLATELETS Thousands/uL 174   < > 182   NEUTROS PCT %  --   --  66   LYMPHS PCT %  --   --  23   MONOS PCT %  --   --  6   EOS PCT %  --   --  3    < > = values in this interval not displayed  Results from last 7 days   Lab Units 03/10/19  1502   SODIUM mmol/L 139   POTASSIUM mmol/L 3 4*   CHLORIDE mmol/L 102   CO2 mmol/L 29   BUN mg/dL 22   CREATININE mg/dL 1 20   ANION GAP mmol/L 8   CALCIUM mg/dL 9 4   ALBUMIN g/dL 3 3*   TOTAL BILIRUBIN mg/dL 0 70   ALK PHOS U/L 77   ALT U/L 30   AST U/L 22   GLUCOSE RANDOM mg/dL 142*     Results from last 7 days   Lab Units 03/10/19  2334   INR  1 21*                   * I Have Reviewed All Lab Data Listed Above  * Additional Pertinent Lab Tests Reviewed:  All Labs Within Last 24 Hours Reviewed    Recent Cultures (last 7 days):       Last 24 Hours Medication List:     Current Facility-Administered Medications:  ALPRAZolam 0 5 mg Oral HS PRN Hamlet Coleman, PA-C    heparin (porcine) 3-30 Units/kg/hr (Order-Specific) Intravenous Titrated Shayne Kline,  Last Rate: Stopped (03/11/19 0902)   heparin (porcine) 4,000 Units Intravenous PRN Trae Prechtel, DO    heparin (porcine) 8,000 Units Intravenous PRN Trae Prechtel, DO    lisinopril 2 5 mg Oral Daily Rita Emanuel PA-C    metoprolol succinate 50 mg Oral Daily Rita Emanuel PA-C         Today, Patient Was Seen By: Sherman Avila MD    ** Please Note: Dictation voice to text software may have been used in the creation of this document   **

## 2019-03-11 NOTE — SOCIAL WORK
CM received Rx from [facility] for Xarelto and Eliquis to send to [facility] for pricing  CM spoke with Ciera from [facility]; after applying free 30 day coupon, future cost will be $42 per month for each  [facility] filled Xarelto and will hold it until patient is ready for dc  CM spoke with patient and he is agreeable to copay cost  CM spoke with [facility] who is agreeable to Xarelto  CM will update [facility] for delivery to bedside when patient is ready for dc

## 2019-03-11 NOTE — PLAN OF CARE
Problem: Potential for Falls  Goal: Patient will remain free of falls  Description  INTERVENTIONS:  - Assess patient frequently for physical needs  -  Identify cognitive and physical deficits and behaviors that affect risk of falls    -  [place] fall precautions as indicated by assessment   - Educate patient/family on patient safety including physical limitations  - Instruct patient to call for assistance with activity based on assessment  - Modify environment to reduce risk of injury  - Consider OT/PT consult to assist with strengthening/mobility  Outcome: Progressing     Problem: PAIN - ADULT  Goal: Verbalizes/displays adequate comfort level or baseline comfort level  Description  Interventions:  - Encourage patient to monitor pain and request assistance  - Assess pain using appropriate pain scale  - Administer analgesics based on type and severity of pain and evaluate response  - Implement non-pharmacological measures as appropriate and evaluate response  - Consider cultural and social influences on pain and pain management  - Notify physician/advanced practitioner if interventions unsuccessful or patient reports new pain  Outcome: Progressing     Problem: INFECTION - ADULT  Goal: Absence or prevention of progression during hospitalization  Description  INTERVENTIONS:  - Assess and monitor for signs and symptoms of infection  - Monitor lab/diagnostic results  - Monitor all insertion sites, i e  indwelling lines, tubes, and drains  - Monitor endotracheal (as able) and nasal secretions for changes in amount and color  - Maurice appropriate cooling/warming therapies per order  - Administer medications as ordered  - Instruct and encourage patient and family to use good hand hygiene technique  - Identify and instruct in appropriate isolation precautions for identified infection/condition  Outcome: Progressing  Goal: Absence of fever/infection during neutropenic period  Description  INTERVENTIONS:  - Monitor WBC  - Implement neutropenic guidelines  Outcome: Progressing     Problem: SAFETY ADULT  Goal: Maintain or return to baseline ADL function  Description  INTERVENTIONS:  -  Assess patient's ability to carry out ADLs; assess patient's baseline for ADL function and identify physical deficits which impact ability to perform ADLs (bathing, care of mouth/teeth, toileting, grooming, dressing, etc )  - Assess/evaluate cause of self-care deficits   - Assess range of motion  - Assess patient's mobility; develop plan if impaired  - Assess patient's need for assistive devices and provide as appropriate  - Encourage maximum independence but intervene and supervise when necessary  ¯ Involve family in performance of ADLs  ¯ Assess for home care needs following discharge   ¯ Request OT consult to assist with ADL evaluation and planning for discharge  ¯ Provide patient education as appropriate  Outcome: Progressing  Goal: Maintain or return mobility status to optimal level  Description  INTERVENTIONS:  - Assess patient's baseline mobility status (ambulation, transfers, stairs, etc )    - Identify cognitive and physical deficits and behaviors that affect mobility  - Identify mobility aids required to assist with transfers and/or ambulation (gait belt, sit-to-stand, lift, walker, cane, etc )  - [place] fall precautions as indicated by assessment  - Record patient progress and toleration of activity level on Mobility SBAR; progress patient to next Phase/Stage  - Instruct patient to call for assistance with activity based on assessment  - Request Rehabilitation consult to assist with strengthening/weightbearing, etc   Outcome: Progressing

## 2019-03-11 NOTE — PLAN OF CARE
CM received Rx from [facility] for Xarelto and Eliquis to send to [facility] for pricing  CM spoke with Ciera from [facility]; after applying free 30 day coupon, future cost will be $42 per month for each  [facility] filled Xarelto and will hold it until patient is ready for dc  CM spoke with patient and he is agreeable to copay cost  CM spoke with SLIM who is agreeable to Xarelto  CM will update [facility] for delivery to bedside when patient is ready for dc

## 2019-03-11 NOTE — UTILIZATION REVIEW
Initial Clinical Review    Admission: Date/Time/Statement: 3/10/19 @ 1727   Orders Placed This Encounter   Procedures    Inpatient Admission     Standing Status:   Standing     Number of Occurrences:   1     Order Specific Question:   Admitting Physician     Answer:   Mickey Ortiz [[id number]]     Order Specific Question:   Level of Care     Answer:   Med Surg [[id number]]     Order Specific Question:   Estimated length of stay     Answer:   More than 2 Midnights     Order Specific Question:   Certification     Answer:   I certify that inpatient services are medically necessary for this patient for a duration of greater than two midnights  See H&P and MD Progress Notes for additional information about the patient's course of treatment  ED: Date/Time/Mode of Arrival:   ED Arrival Information     Expected Arrival Acuity Means of Arrival Escorted By Service Admission Type    - 3/10/2019 14:09 Urgent Ambulance - Hospitalist Urgent    Arrival Complaint    -        Chief Complaint:   Chief Complaint   Patient presents with    Shortness of Breath     Pt reports having BM and afterwards while trying to get up he had SOB  Pt reports breathing is back to normal  Pt denies CP but reports having taken 1 nitro which reports may be   Assessment/Plan: this is a 70year old male from home with past medical history of hypertension, arthritis and hyperlipidemia who presented to ED with shortness of breath  Onset was abrupt and did not relieve with ntg  In  The ED, D dimer elevated to 7695, troponin to 0 48 with ct chest showing extensive bilateral pulmonary emboli with right heart strain  Venous doppler showed right femoral DVT  Patient is admitted inpatient with PE/DVT    Plan includes: anticoagulation with iv heparin, echo    ED Vital Signs:   ED Triage Vitals   Temperature Pulse Respirations Blood Pressure SpO2   03/10/19 1435 03/10/19 1415 03/10/19 1415 03/10/19 1415 03/10/19 1415   98 5 °F (36 9 °C) 104 18 126/79 95 % Temp Source Heart Rate Source Patient Position - Orthostatic VS BP Location FiO2 (%)   03/10/19 1435 03/10/19 1415 03/10/19 1415 03/10/19 1415 --   Oral Monitor Sitting Right arm       Pain Score       03/10/19 1415       No Pain        Wt Readings from Last 1 Encounters:   03/10/19 104 kg (229 lb 0 9 oz)     Vital Signs (abnormal):   03/10/19 1837  98 8 °F (37 1 °C)  107Abnormal   20  153/97  --  93 %  None (Room air)  Lying   03/10/19 1800  --  102  20  151/101Abnormal   --  95 %  None (Room air)         Pertinent Labs/Diagnostic Test Results:   D dimer 7695  Troponin 0 48  K 3 4  Glucose 142  Albumin 3 3      cta chest -  Extensive bilateral pulmonary emboli as described above including pulmonary emboli within right and left pulmonary arteries   No main pulmonary artery or saddle pulmonary embolus identified  2   Accurate calculation of the ratio of right ventricular to left ventricular diameter (RV/LV ratio) is difficult due to left wall hypertrophy, however it does visually appear to be greater than 0 9 which is an indicator of right heart strain   Additionally, there is reflux of contrast into the intrahepatic IVC and hepatic veins, which can be seen with right heart strain  An abnormal RV/LV ratio has been shown to be associated with an increased risk of 30 day mortality in the setting of acute pulmonary embolism    Bilateral venous dopplers - RIGHT LOWER LIMB: ABNORMAL  There is a focal area of acute non occlusive thrombus in the proximal femoralvein  No evidence of superficial thrombophlebitis noted  Doppler evaluation shows a normal response to augmentation maneuvers  Popliteal, posterior tibial and anterior tibial arterial Doppler waveforms are  triphasic     LEFT LOWER LIMB:No evidence of acute or chronic deep vein thrombosis  No evidence of superficial thrombophlebitis noted  Doppler evaluation shows a normal response to augmentation maneuvers    Popliteal, posterior tibial and anterior tibial arterial Doppler waveforms are  Triphasic    1849 troponin 1 85  2144 troponin 2 37   2334 troponin 2 51  0235 troponin 1 35  0544 troponin 1 38  0748 troponin 0 96    ED Treatment:   Medication Administration from 03/10/2019 1408 to 03/10/2019 1831       Date/Time Order Dose Route Action Comments     03/10/2019 1535 aspirin tablet 325 mg 325 mg Oral Given      03/10/2019 1619 sodium chloride 0 9 % bolus 1,000 mL 1,000 mL Intravenous New Bag      03/10/2019 1638 iohexol (OMNIPAQUE) 350 MG/ML injection (SINGLE-DOSE) 85 mL 85 mL Intravenous Given      03/10/2019 1741 heparin (porcine) injection 8,400 Units 8,400 Units Intravenous Given      03/10/2019 1741 heparin (porcine) 25,000 units in 250 mL infusion (premix) 18 Units/kg/hr Intravenous New Bag         Past Medical/Surgical History:   Past Medical History:   Diagnosis Date    Hyperlipidemia     Hypertension      Admitting Diagnosis: Shortness of breath [R06 02]  Elevated troponin [R74 8]  Other acute pulmonary embolism without acute cor pulmonale (HCC) [I26 99]  Age/Sex: 70 y o  male     Admission Orders: 3/10/2019 1727 INPATIENT   Scheduled Meds:   Current Facility-Administered Medications:  ALPRAZolam 0 5 mg Oral HS PRN    heparin (porcine) 3-30 Units/kg/hr (Order-Specific) Intravenous Titrated Last Rate: Stopped (03/11/19 0902)   heparin (porcine) 4,000 Units Intravenous PRN    heparin (porcine) 8,000 Units Intravenous PRN    lisinopril 2 5 mg Oral Daily    metoprolol succinate 50 mg Oral Daily      Continuous Infusions:   heparin (porcine) 3-30 Units/kg/hr (Order-Specific) Last Rate: Stopped (03/11/19 0902)     PRN Meds:   ALPRAZolam - used x 1  OTHER ORDERS: telemetry  echo    Network Utilization Review Department  Phone: [phone]; Fax [phone]  [email or web address]  ATTENTION: Please call with any questions or concerns to [phone]  and carefully listen to the prompts so that you are directed to the right person  Send all requests for admission clinical reviews, approved or denied determinations and any other requests to fax [phone]   All voicemails are confidential

## 2019-03-11 NOTE — RESPIRATORY THERAPY NOTE
Home Oxygen Qualifying Test       Patient name: Adalberto Villagomez        : 1940   Date of Test:  2019  Diagnosis:      Home Oxygen Test:    **Medicare Guidelines require item(s) 1-5 on all ambulatory patients or 1 and 2 on non-ambulatory patients  1   Baseline SPO2 on Room Air at rest 94 %  2   SPO2 during exercise on Room Air 96 %  During exercise monitor SpO2  If SPO2 increases >=89% with ambulation do not add supplemental             oxygen  If <= 88% on room air add O2 via NC and titrate patient  Patient must be ambulated with O2 and titrated to > 88% with exertion  3   SPO2 on Oxygen at rest  %  lpm     4   SPO2 during exercise on Oxygen  % a liter flow of lpm     5   Exercise performed:          walking, duration 6 (min), distance 400 (feet)          []  Supplemental Home Oxygen is indicated  [x]  Client does not qualify for home oxygen  Respiratory Additional Notes- Pt found on room air, spo2 94%, pt walked on room air 400 ft with no drop in Oxygen saturation  Pt does not qualify for home Oxygen      Enrique 75, RT

## 2019-03-11 NOTE — ASSESSMENT & PLAN NOTE
It looks like he has a DVT of the leg as well  He also some evidence on CT chest of right heart strain  Will start him on IV heparin  He would prefer to use one of the newer anticoagulants because his mother was on Coumadin and he did not like all the blood testing  So when we are ready to transition him to oral medications, we could use Xarelto or Eliquis  Check Echocardiogram to look for right heart strain  He is currently getting LE u/s in the ER      3/11:  acute non occlusive thrombus in the R proximal femoral vein  No evidence of superficial thrombophlebitis noted    Pending echo to evaluate RV   Continue with Heparin gtt  Send for hypercoagulable workup   Will speak with SW and CM regarding NOAC as patient interested in newer medications as opposed to Coumadin  PT/OT  Ambulatory pulse ox

## 2019-03-12 ENCOUNTER — APPOINTMENT (INPATIENT)
Dept: NON INVASIVE DIAGNOSTICS | Facility: HOSPITAL | Age: 79
DRG: 176 | End: 2019-03-12
Payer: COMMERCIAL

## 2019-03-12 ENCOUNTER — APPOINTMENT (INPATIENT)
Dept: CT IMAGING | Facility: HOSPITAL | Age: 79
DRG: 176 | End: 2019-03-12
Payer: COMMERCIAL

## 2019-03-12 VITALS
BODY MASS INDEX: 30.36 KG/M2 | SYSTOLIC BLOOD PRESSURE: 142 MMHG | DIASTOLIC BLOOD PRESSURE: 83 MMHG | HEART RATE: 86 BPM | RESPIRATION RATE: 18 BRPM | OXYGEN SATURATION: 97 % | TEMPERATURE: 97.5 F | HEIGHT: 73 IN | WEIGHT: 229.06 LBS

## 2019-03-12 PROBLEM — I82.411 DVT OF DEEP FEMORAL VEIN, RIGHT (HCC): Status: ACTIVE | Noted: 2019-03-12

## 2019-03-12 PROBLEM — I21.A1 TYPE 2 ACUTE MYOCARDIAL INFARCTION (HCC): Status: ACTIVE | Noted: 2019-03-12

## 2019-03-12 LAB
ANION GAP SERPL CALCULATED.3IONS-SCNC: 10 MMOL/L (ref 4–13)
APTT PPP: 88 SECONDS (ref 26–38)
APTT PPP: 89 SECONDS (ref 26–38)
BUN SERPL-MCNC: 18 MG/DL (ref 5–25)
CALCIUM SERPL-MCNC: 9.2 MG/DL (ref 8.3–10.1)
CARDIOLIPIN IGA SER IA-ACNC: <9 APL U/ML (ref 0–11)
CARDIOLIPIN IGG SER IA-ACNC: <9 GPL U/ML (ref 0–14)
CARDIOLIPIN IGM SER IA-ACNC: <9 MPL U/ML (ref 0–12)
CHLORIDE SERPL-SCNC: 104 MMOL/L (ref 100–108)
CHOLEST SERPL-MCNC: 197 MG/DL (ref 50–200)
CO2 SERPL-SCNC: 25 MMOL/L (ref 21–32)
CREAT SERPL-MCNC: 0.99 MG/DL (ref 0.6–1.3)
ERYTHROCYTE [DISTWIDTH] IN BLOOD BY AUTOMATED COUNT: 13.3 % (ref 11.6–15.1)
EST. AVERAGE GLUCOSE BLD GHB EST-MCNC: 126 MG/DL
GFR SERPL CREATININE-BSD FRML MDRD: 74 ML/MIN/1.73SQ M
GLUCOSE SERPL-MCNC: 109 MG/DL (ref 65–140)
HBA1C MFR BLD: 6 % (ref 4.2–6.3)
HCT VFR BLD AUTO: 44.4 % (ref 36.5–49.3)
HDLC SERPL-MCNC: 43 MG/DL (ref 40–60)
HGB BLD-MCNC: 14.7 G/DL (ref 12–17)
LDLC SERPL CALC-MCNC: 131 MG/DL (ref 0–100)
MCH RBC QN AUTO: 29.1 PG (ref 26.8–34.3)
MCHC RBC AUTO-ENTMCNC: 33.1 G/DL (ref 31.4–37.4)
MCV RBC AUTO: 88 FL (ref 82–98)
NONHDLC SERPL-MCNC: 154 MG/DL
PLATELET # BLD AUTO: 182 THOUSANDS/UL (ref 149–390)
PMV BLD AUTO: 9.9 FL (ref 8.9–12.7)
POTASSIUM SERPL-SCNC: 3.5 MMOL/L (ref 3.5–5.3)
RBC # BLD AUTO: 5.05 MILLION/UL (ref 3.88–5.62)
SODIUM SERPL-SCNC: 139 MMOL/L (ref 136–145)
TRIGL SERPL-MCNC: 116 MG/DL
WBC # BLD AUTO: 8.29 THOUSAND/UL (ref 4.31–10.16)

## 2019-03-12 PROCEDURE — 80061 LIPID PANEL: CPT | Performed by: HOSPITALIST

## 2019-03-12 PROCEDURE — 99239 HOSP IP/OBS DSCHRG MGMT >30: CPT | Performed by: INTERNAL MEDICINE

## 2019-03-12 PROCEDURE — 83036 HEMOGLOBIN GLYCOSYLATED A1C: CPT | Performed by: HOSPITALIST

## 2019-03-12 PROCEDURE — 80048 BASIC METABOLIC PNL TOTAL CA: CPT | Performed by: HOSPITALIST

## 2019-03-12 PROCEDURE — 85027 COMPLETE CBC AUTOMATED: CPT | Performed by: HOSPITALIST

## 2019-03-12 PROCEDURE — 93306 TTE W/DOPPLER COMPLETE: CPT | Performed by: INTERNAL MEDICINE

## 2019-03-12 PROCEDURE — 85730 THROMBOPLASTIN TIME PARTIAL: CPT | Performed by: HOSPITALIST

## 2019-03-12 PROCEDURE — 93306 TTE W/DOPPLER COMPLETE: CPT

## 2019-03-12 PROCEDURE — 74177 CT ABD & PELVIS W/CONTRAST: CPT

## 2019-03-12 RX ORDER — ALPRAZOLAM 0.5 MG/1
0.5 TABLET ORAL
Qty: 30 TABLET | Refills: 0
Start: 2019-03-12 | End: 2019-03-22

## 2019-03-12 RX ADMIN — HEPARIN SODIUM AND DEXTROSE 14 UNITS/KG/HR: 10000; 5 INJECTION INTRAVENOUS at 02:39

## 2019-03-12 RX ADMIN — IOHEXOL 100 ML: 350 INJECTION, SOLUTION INTRAVENOUS at 15:49

## 2019-03-12 RX ADMIN — RIVAROXABAN 15 MG: 15 TABLET, FILM COATED ORAL at 16:25

## 2019-03-12 NOTE — PLAN OF CARE
Problem: Potential for Falls  Goal: Patient will remain free of falls  Description  INTERVENTIONS:  - Assess patient frequently for physical needs  -  Identify cognitive and physical deficits and behaviors that affect risk of falls    -  [place] fall precautions as indicated by assessment   - Educate patient/family on patient safety including physical limitations  - Instruct patient to call for assistance with activity based on assessment  - Modify environment to reduce risk of injury  - Consider OT/PT consult to assist with strengthening/mobility  Outcome: Progressing     Problem: PAIN - ADULT  Goal: Verbalizes/displays adequate comfort level or baseline comfort level  Description  Interventions:  - Encourage patient to monitor pain and request assistance  - Assess pain using appropriate pain scale  - Administer analgesics based on type and severity of pain and evaluate response  - Implement non-pharmacological measures as appropriate and evaluate response  - Consider cultural and social influences on pain and pain management  - Notify physician/advanced practitioner if interventions unsuccessful or patient reports new pain  Outcome: Progressing     Problem: INFECTION - ADULT  Goal: Absence or prevention of progression during hospitalization  Description  INTERVENTIONS:  - Assess and monitor for signs and symptoms of infection  - Monitor lab/diagnostic results  - Monitor all insertion sites, i e  indwelling lines, tubes, and drains  - Monitor endotracheal (as able) and nasal secretions for changes in amount and color  - [place] appropriate cooling/warming therapies per order  - Administer medications as ordered  - Instruct and encourage patient and family to use good hand hygiene technique  - Identify and instruct in appropriate isolation precautions for identified infection/condition  Outcome: Progressing  Goal: Absence of fever/infection during neutropenic period  Description  INTERVENTIONS:  - Monitor WBC  - Implement neutropenic guidelines  Outcome: Progressing     Problem: SAFETY ADULT  Goal: Maintain or return to baseline ADL function  Description  INTERVENTIONS:  -  Assess patient's ability to carry out ADLs; assess patient's baseline for ADL function and identify physical deficits which impact ability to perform ADLs (bathing, care of mouth/teeth, toileting, grooming, dressing, etc )  - Assess/evaluate cause of self-care deficits   - Assess range of motion  - Assess patient's mobility; develop plan if impaired  - Assess patient's need for assistive devices and provide as appropriate  - Encourage maximum independence but intervene and supervise when necessary  ¯ Involve family in performance of ADLs  ¯ Assess for home care needs following discharge   ¯ Request OT consult to assist with ADL evaluation and planning for discharge  ¯ Provide patient education as appropriate  Outcome: Progressing  Goal: Maintain or return mobility status to optimal level  Description  INTERVENTIONS:  - Assess patient's baseline mobility status (ambulation, transfers, stairs, etc )    - Identify cognitive and physical deficits and behaviors that affect mobility  - Identify mobility aids required to assist with transfers and/or ambulation (gait belt, sit-to-stand, lift, walker, cane, etc )  - [place] fall precautions as indicated by assessment  - Record patient progress and toleration of activity level on Mobility SBAR; progress patient to next Phase/Stage  - Instruct patient to call for assistance with activity based on assessment  - Request Rehabilitation consult to assist with strengthening/weightbearing, etc   Outcome: Progressing     Problem: DISCHARGE PLANNING  Goal: Discharge to home or other facility with appropriate resources  Description  INTERVENTIONS:  - Identify barriers to discharge w/patient and caregiver  - Arrange for needed discharge resources and transportation as appropriate  - Identify discharge learning needs (meds, wound care, etc )  - Arrange for interpretive services to assist at discharge as needed  - Refer to Case Management Department for coordinating discharge planning if the patient needs post-hospital services based on physician/advanced practitioner order or complex needs related to functional status, cognitive ability, or social support system  Outcome: Progressing     Problem: Knowledge Deficit  Goal: Patient/family/caregiver demonstrates understanding of disease process, treatment plan, medications, and discharge instructions  Description  Complete learning assessment and assess knowledge base    Interventions:  - Provide teaching at level of understanding  - Provide teaching via preferred learning methods  Outcome: Progressing

## 2019-03-12 NOTE — ASSESSMENT & PLAN NOTE
· Bilateral PE with Right femoral DVT  · Suspected right heart strain - echocardiogram pending  · On heparin drip, will transition to Xarelto  · Thrombosis panel pending  · Will check CT abd/pelvis to rule out malignancy - will need to continue malignancy screening as outpatient  Patient states most recent colonoscopy was ok    · Patient is sedentary, patient also states that both of his sisters have had blood clots in the past   · Denies recent travel, known active malignancy, history of blood clots  · Will need follow up with PCP

## 2019-03-12 NOTE — DISCHARGE SUMMARY
Discharge- Rg Ojeda 1940, 70 y o  male MRN: [id number]    Unit/Bed#: -01 Encounter: [id number]    Primary Care Provider: Deidra Garcia MD   Date and time admitted to hospital: 3/10/2019  2:09 PM        * Acute pulmonary embolism [facility])  Assessment & Plan  · Bilateral PE with Right femoral DVT  · Suspected right heart strain - echocardiogram pending  · On heparin drip, will transition to Xarelto  · Thrombosis panel pending  · Will check CT abd/pelvis to rule out malignancy - will need to continue malignancy screening as outpatient  Patient states most recent colonoscopy was ok  · Patient is sedentary, patient also states that both of his sisters have had blood clots in the past   · Denies recent travel, known active malignancy, history of blood clots  · Will need follow up with PCP    DVT of deep femoral vein, right (HCC)  Assessment & Plan  · Continue anticoagulation  · Patient currently asymptomatic     Type 2 acute myocardial infarction [facility])  Assessment & Plan  · Peak troponin of 2 5  · Suspect demand from extensive PE  · Troponin trending down  · Patient hemodynamically stable  · Denies chest pain  · Echo pending      Discharging Physician / Practitioner: Marisol David PA-C  PCP: Deidra Garcia MD  Admission Date:   Admission Orders (From admission, onward)    Ordered        03/10/19 1726  Inpatient Admission  Once     Order ID Start Status   [id number] 03/10/19 1727 Completed              Discharge Date: 03/12/19    Resolved Problems  Date Reviewed: 3/12/2019    None          Consultations During Hospital Stay:  · none    Procedures Performed:   · CTA chest - 1  Extensive bilateral pulmonary emboli as described above including pulmonary emboli within right and left pulmonary arteries  No main pulmonary artery or saddle pulmonary embolus identified   2   Accurate calculation of the ratio of right ventricular to left ventricular diameter (RV/LV ratio) is difficult due to left wall hypertrophy, however it does visually appear to be greater than 0 9 which is an indicator of right heart strain  Additionally, there is reflux of contrast into the intrahepatic IVC and hepatic veins, which can be seen with right heart strain  An abnormal RV/LV ratio has been shown to be associated with an increased risk of 30 day mortality in the setting of acute pulmonary embolism  · LE dopplers - RIGHT LOWER LIMB: ABNORMAL  There is a focal area of acute non occlusive thrombus in the proximal femoral  vein  No evidence of superficial thrombophlebitis noted  Doppler evaluation shows a normal response to augmentation maneuvers  Popliteal, posterior tibial and anterior tibial arterial Doppler waveforms are  triphasic     LEFT LOWER LIMB:  No evidence of acute or chronic deep vein thrombosis  No evidence of superficial thrombophlebitis noted  Doppler evaluation shows a normal response to augmentation maneuvers  Popliteal, posterior tibial and anterior tibial arterial Doppler waveforms are  Triphasic    · CT abd/pelvis - No signs of malignancy within the abdomen or the pelvis  · Echocardiogram - EF55%, no regional wma  RV systolic function normal    Significant Findings / Test Results:   · See above    Incidental Findings:   · none     Test Results Pending at Discharge (will require follow up): · Thrombosis panel     Outpatient Tests Requested:  · none    Complications:  none    Reason for Admission: chest pain    Hospital Course:     Clifton Anderson is a 70 y o  male patient who originally presented to the hospital on 3/10/2019 due to chest pain  Patient was found to have extensive bilateral PEs as well as a right femoral DVT  He was started on a heparin drip and will be transitioned to Xarelto  Thrombosis panel is still pending  CT of abdomen and pelvis prior to discharge was unremarkable for malignancy    Further workup should be considered as an outpatient, though patient states he has had a recent colonoscopy which was okay  Echocardiogram was unremarkable  Patient did have troponin elevation but this was likely secondary to demand from PE  On discharge, patient is asymptomatic  He denies chest pain, shortness of breath, abdominal pain, lower extremity swelling  Please see above list of diagnoses and related plan for additional information  Condition at Discharge: good     Discharge Day Visit / Exam:     Subjective:  No complaints   Vitals: Blood Pressure: 143/88 (03/12/19 0736)  Pulse: 71 (03/12/19 0736)  Temperature: 98 5 °F (36 9 °C) (03/12/19 0736)  Temp Source: Oral (03/12/19 0736)  Respirations: 18 (03/12/19 0736)  Height: 6' 1" (185 4 cm) (03/10/19 1837)  Weight - Scale: 104 kg (229 lb 0 9 oz) (03/10/19 1837)  SpO2: 95 % (03/12/19 0736)  Exam:   Physical Exam   Constitutional: He is oriented to person, place, and time  He appears well-developed  No distress  HENT:   Head: Normocephalic and atraumatic  Neck: Normal range of motion  Neck supple  Cardiovascular: Normal rate and regular rhythm  No murmur heard  Pulmonary/Chest: Effort normal and breath sounds normal  No respiratory distress  He has no wheezes  He has no rales  Abdominal: Soft  Bowel sounds are normal  He exhibits no distension  Musculoskeletal: He exhibits no edema  Neurological: He is alert and oriented to person, place, and time  No cranial nerve deficit  Skin: Skin is warm and dry  No rash noted  Psychiatric: He has a normal mood and affect  Discussion with Family: message left for wife with update    Discharge instructions/Information to patient and family:   See after visit summary for information provided to patient and family  Provisions for Follow-Up Care:  See after visit summary for information related to follow-up care and any pertinent home health orders        Disposition:     Home    For Discharges to [facility]:   · Not Applicable to this Patient - Not Applicable to this Patient    Planned Readmission: none     Discharge Statement:  I spent 45 minutes discharging the patient  This time was spent on the day of discharge  I had direct contact with the patient on the day of discharge  Greater than 50% of the total time was spent examining patient, answering all patient questions, arranging and discussing plan of care with patient as well as directly providing post-discharge instructions  Additional time then spent on discharge activities  Discharge Medications:  See after visit summary for reconciled discharge medications provided to patient and family        ** Please Note: This note has been constructed using a voice recognition system **

## 2019-03-12 NOTE — ASSESSMENT & PLAN NOTE
· Peak troponin of 2 5  · Suspect demand from extensive PE  · Troponin trending down  · Patient hemodynamically stable  · Denies chest pain  · Echo pending

## 2019-03-13 LAB
B2 GLYCOPROT1 IGA SER-ACNC: <9 GPI IGA UNITS (ref 0–25)
B2 GLYCOPROT1 IGG SER-ACNC: <9 GPI IGG UNITS (ref 0–20)
B2 GLYCOPROT1 IGM SER-ACNC: <9 GPI IGM UNITS (ref 0–32)
PROT S ACT/NOR PPP: 79 % (ref 57–157)
PROT S PPP-ACNC: 88 % (ref 60–150)

## 2019-03-14 LAB
APTT SCREEN TO CONFIRM RATIO: 0.83 RATIO (ref 0–1.4)
CONFIRM APTT/NORMAL: 54.6 SEC (ref 0–55)
LA PPP-IMP: ABNORMAL
PROT S ACT/NOR PPP: 60 % (ref 63–140)
SCREEN APTT: 39.5 SEC (ref 0–51.9)
SCREEN DRVVT: 44.3 SEC (ref 0–47)
THROMBIN TIME: 89.2 SEC (ref 0–23)
TT IMM NP PPP: 54.8 SEC (ref 0–23)
TT P HPASE PPP: 18.4 SEC (ref 0–23)

## 2019-03-15 LAB
F2 GENE MUT ANL BLD/T: NORMAL
F5 GENE MUT ANL BLD/T: ABNORMAL

## 2021-02-14 ENCOUNTER — IMMUNIZATIONS (OUTPATIENT)
Dept: FAMILY MEDICINE CLINIC | Facility: HOSPITAL | Age: 81
End: 2021-02-14

## 2021-02-14 DIAGNOSIS — Z23 ENCOUNTER FOR IMMUNIZATION: Primary | ICD-10-CM

## 2021-02-14 PROCEDURE — 0001A SARS-COV-2 / COVID-19 MRNA VACCINE (PFIZER-BIONTECH) 30 MCG: CPT

## 2021-02-14 PROCEDURE — 91300 SARS-COV-2 / COVID-19 MRNA VACCINE (PFIZER-BIONTECH) 30 MCG: CPT

## 2021-03-07 ENCOUNTER — IMMUNIZATIONS (OUTPATIENT)
Dept: FAMILY MEDICINE CLINIC | Facility: HOSPITAL | Age: 81
End: 2021-03-07

## 2021-03-07 DIAGNOSIS — Z23 ENCOUNTER FOR IMMUNIZATION: Primary | ICD-10-CM

## 2021-03-07 PROCEDURE — 0002A SARS-COV-2 / COVID-19 MRNA VACCINE (PFIZER-BIONTECH) 30 MCG: CPT

## 2021-03-07 PROCEDURE — 91300 SARS-COV-2 / COVID-19 MRNA VACCINE (PFIZER-BIONTECH) 30 MCG: CPT

## 2022-08-11 NOTE — TELEPHONE ENCOUNTER
Called patient to see if they were available to come in today for an appt instead of 8/17  Call was disconnected before voicemail

## 2022-08-17 ENCOUNTER — APPOINTMENT (OUTPATIENT)
Dept: RADIOLOGY | Facility: MEDICAL CENTER | Age: 82
End: 2022-08-17
Payer: COMMERCIAL

## 2022-08-17 VITALS
HEART RATE: 66 BPM | WEIGHT: 212 LBS | DIASTOLIC BLOOD PRESSURE: 76 MMHG | SYSTOLIC BLOOD PRESSURE: 128 MMHG | HEIGHT: 73 IN | BODY MASS INDEX: 28.1 KG/M2

## 2022-08-17 DIAGNOSIS — G89.29 BILATERAL CHRONIC KNEE PAIN: Primary | ICD-10-CM

## 2022-08-17 DIAGNOSIS — M25.562 BILATERAL CHRONIC KNEE PAIN: ICD-10-CM

## 2022-08-17 DIAGNOSIS — M25.561 BILATERAL CHRONIC KNEE PAIN: Primary | ICD-10-CM

## 2022-08-17 DIAGNOSIS — G89.29 BILATERAL CHRONIC KNEE PAIN: ICD-10-CM

## 2022-08-17 DIAGNOSIS — M25.562 BILATERAL CHRONIC KNEE PAIN: Primary | ICD-10-CM

## 2022-08-17 DIAGNOSIS — M25.561 BILATERAL CHRONIC KNEE PAIN: ICD-10-CM

## 2022-08-17 PROCEDURE — 20610 DRAIN/INJ JOINT/BURSA W/O US: CPT | Performed by: PHYSICAL MEDICINE & REHABILITATION

## 2022-08-17 PROCEDURE — 73562 X-RAY EXAM OF KNEE 3: CPT

## 2022-08-17 PROCEDURE — 99204 OFFICE O/P NEW MOD 45 MIN: CPT | Performed by: PHYSICAL MEDICINE & REHABILITATION

## 2022-08-17 RX ORDER — LIDOCAINE HYDROCHLORIDE 10 MG/ML
3 INJECTION, SOLUTION INFILTRATION; PERINEURAL
Status: COMPLETED | OUTPATIENT
Start: 2022-08-17 | End: 2022-08-17

## 2022-08-17 RX ORDER — TRIAMCINOLONE ACETONIDE 40 MG/ML
80 INJECTION, SUSPENSION INTRA-ARTICULAR; INTRAMUSCULAR
Status: COMPLETED | OUTPATIENT
Start: 2022-08-17 | End: 2022-08-17

## 2022-08-17 RX ADMIN — TRIAMCINOLONE ACETONIDE 80 MG: 40 INJECTION, SUSPENSION INTRA-ARTICULAR; INTRAMUSCULAR at 10:12

## 2022-08-17 RX ADMIN — LIDOCAINE HYDROCHLORIDE 3 ML: 10 INJECTION, SOLUTION INFILTRATION; PERINEURAL at 10:12

## 2022-08-17 NOTE — PROGRESS NOTES
1  Bilateral chronic knee pain  XR knee 3 vw right non injury    XR knee 3 vw left non injury    Large joint arthrocentesis: bilateral knee     Orders Placed This Encounter   Procedures    Large joint arthrocentesis: bilateral knee    XR knee 3 vw right non injury    XR knee 3 vw left non injury     Impression:  Chronic bilateral knee pain likely secondary to osteoarthritis  We obtained x-rays as below  We discussed different treatment options and decided to proceed with bilateral knee injections  Can consider viscosupplementation in the future  I will see him back in six weeks if needed  Patient reported immediate relief of his knee symptoms and felt that he was able to walk better  Can also consider bilateral hip x-rays on his follow-up visit  The patient would like to hold off on physical therapy due to trouble with his gait and driving  Imaging Studies (I personally reviewed images in PACS and report):  Bilateral knee x-rays most recent to this encounter reviewed  These images show mild tricompartmental osteoarthritis  No acute osseous abnormalities  Vascular calcifications are noted  No follow-ups on file  Patient is in agreement with the above plan  HPI:  Erin Wiseman is a [de-identified] y o  male  who presents for evaluation of   Chief Complaint   Patient presents with    Left Knee - Pain    Right Knee - Pain       Onset/Mechanism:  Chronic pain in the knees  Location:  In the knees  Radiation:  Denies  Provocative: Activity  Severity:  Painful  Associated Symptoms:  As above  Treatment so far:  No recent treatment      Following history reviewed and updated:  Past Medical History:   Diagnosis Date    Hyperlipidemia     Hypertension      Past Surgical History:   Procedure Laterality Date    CARDIAC SURGERY      2 stents placed     Social History   Social History     Substance and Sexual Activity   Alcohol Use Never     Social History     Substance and Sexual Activity   Drug Use Never Social History     Tobacco Use   Smoking Status Never Smoker   Smokeless Tobacco Never Used     History reviewed  No pertinent family history  Allergies   Allergen Reactions    Codeine         Constitutional:  /76   Pulse 66   Ht 6' 1" (1 854 m)   Wt 96 2 kg (212 lb)   BMI 27 97 kg/m²    General: NAD  Eyes: Anicteric sclerae  Neck: Supple  Lungs: Unlabored breathing  Cardiovascular: No lower extremity edema  Skin: Intact without erythema  Neurologic: Sensation intact to light touch  Psychiatric: Mood and affect are appropriate  Right Knee Exam     Tenderness   The patient is experiencing tenderness in the medial joint line  Range of Motion   Extension: normal   Flexion: abnormal     Tests   Varus: negative Valgus: negative    Other   Erythema: absent  Scars: absent  Sensation: normal  Pulse: present  Swelling: none      Left Knee Exam     Tenderness   The patient is experiencing tenderness in the medial joint line  Range of Motion   Extension: normal   Flexion: abnormal     Tests   Varus: negative Valgus: negative    Other   Erythema: absent  Scars: absent  Sensation: normal  Pulse: present  Swelling: none             Large joint arthrocentesis: bilateral knee  Universal Protocol:  Consent: Verbal consent obtained  Written consent not obtained  Risks and benefits: risks, benefits and alternatives were discussed  Consent given by: patient  Timeout called at: 8/17/2022 10:12 AM   Patient understanding: patient states understanding of the procedure being performed  Patient consent: the patient's understanding of the procedure matches consent given  Site marked: the operative site was marked  Radiology Images displayed and confirmed   If images not available, report reviewed: imaging studies available  Patient identity confirmed: verbally with patient    Supporting Documentation  Indications: pain   Procedure Details  Location: knee - bilateral knee  Needle size: 22 G  Ultrasound guidance: no  Approach: Inferolateral to patella  Medications (Right): 3 mL lidocaine 1 %; 80 mg triamcinolone acetonide 40 mg/mLMedications (Left): 3 mL lidocaine 1 %; 80 mg triamcinolone acetonide 40 mg/mL   Patient tolerance: patient tolerated the procedure well with no immediate complications  Dressing:  Sterile dressing applied    There was little to no resistance encountered during the injection  Prior to the procedure, the patient was informed of the following risks in layman terms:    - Risk of bleeding since a needle is involved  - Risk of infection (1/10,000 chance as per recent studies)  Signs/symptoms were discussed and they would prompt an urgent evaluation at an emergency department   - Risk of pigmentation or skin dimpling in the skin (2-3% chance as per recent studies) from the steroid  - Risk of increased pain from steroid flare (1% chance as per recent studies) that typically lasts 24-48 hours  - Risk of increased blood sugars from the steroid medication that can last for a few weeks  If the patient is a diabetic or pre-diabetic, they were encouraged to closely monitor their blood sugars and discuss with PCP if elevated more than usual or if having symptoms  After going over these risks, we decided that the benefits outweigh the risks and proceeded with the procedure